# Patient Record
Sex: FEMALE | Race: OTHER | HISPANIC OR LATINO | URBAN - METROPOLITAN AREA
[De-identification: names, ages, dates, MRNs, and addresses within clinical notes are randomized per-mention and may not be internally consistent; named-entity substitution may affect disease eponyms.]

---

## 2021-03-08 ENCOUNTER — EMERGENCY (EMERGENCY)
Facility: HOSPITAL | Age: 26
LOS: 1 days | Discharge: HOME | End: 2021-03-08
Admitting: EMERGENCY MEDICINE
Payer: MEDICAID

## 2021-03-08 VITALS
SYSTOLIC BLOOD PRESSURE: 124 MMHG | RESPIRATION RATE: 18 BRPM | OXYGEN SATURATION: 100 % | HEART RATE: 93 BPM | DIASTOLIC BLOOD PRESSURE: 62 MMHG | TEMPERATURE: 99 F

## 2021-03-08 PROCEDURE — 99283 EMERGENCY DEPT VISIT LOW MDM: CPT

## 2021-03-08 RX ORDER — OXYCODONE HYDROCHLORIDE 5 MG/1
1 TABLET ORAL
Qty: 6 | Refills: 0
Start: 2021-03-08

## 2021-03-08 RX ORDER — OXYCODONE AND ACETAMINOPHEN 5; 325 MG/1; MG/1
1 TABLET ORAL ONCE
Refills: 0 | Status: DISCONTINUED | OUTPATIENT
Start: 2021-03-08 | End: 2021-03-08

## 2021-03-08 RX ADMIN — OXYCODONE AND ACETAMINOPHEN 1 TABLET(S): 5; 325 TABLET ORAL at 22:30

## 2021-03-08 NOTE — ED ADULT NURSE NOTE - OBJECTIVE STATEMENT
patient [id number]. ambulatory. came in with left tooth pain. complains of upper and lower tooth pain. needs a root canal. ice pack applied to area. given med as ordered. Will continue to monitor

## 2021-03-08 NOTE — ED ADULT TRIAGE NOTE - CHIEF COMPLAINT QUOTE
Pt. c/o dental pain, left upper and lower. States she has a tooth infection and was seen at emergency dental, prescribed 4mg dexamethasone and 800mg ibuprofen, took it around 7 pm without relief. Denies SOB, chills, cough. Endorses fever earlier this morning.

## 2021-03-08 NOTE — ED PROVIDER NOTE - PATIENT PORTAL LINK FT
You can access the FollowMyHealth Patient Portal offered by [facility] by registering at the following website: [email or web address]. By joining [employer]’s FollowMyHealth portal, you will also be able to view your health information using other applications (apps) compatible with our system.

## 2021-03-08 NOTE — ED PROVIDER NOTE - NSFOLLOWUPINSTRUCTIONS_ED_ALL_ED_FT
Follow up with your Dentist or call the clinic to schedule an appointment [phone].    Soft diet eat on opposite side of mouth.    Percocet 5/325mg  1 tablet every 6 hrs as needed for severe pain. Do not drive or drink alcohol while taking this medications.   Return to the ER for any persistent/worsening or new symptoms swelling, fevers, chills or any concerning symptoms.

## 2021-03-08 NOTE — ED ADULT TRIAGE NOTE - PAIN RATING/NUMBER SCALE (0-10): REST
Last time she spoke to Dr. Evangelina Singh she wanted her to get a colonoscopy.  N[employer] Artist would Elicia Bamberger to know how she would go about doing that and is Dr. Evangelina Singh knows of anyone around her new area in [address] [place] [address] Tae Neff Terrell Alan
Patient said that she needs to switch to another primary care physician. Advised to call the local hospital to get the names of GI doctors there.  Advised to let us know if she needs a referral.
10

## 2021-03-08 NOTE — ED PROVIDER NOTE - CLINICAL SUMMARY MEDICAL DECISION MAKING FREE TEXT BOX
25 y/o female with no significant PMHx presents to the ER c/o 4 days of right upper/lower rear tooth pain.  Pt is scheduled for a root canal in 2 days.  Pt states she is currently taking Amoxicillin, steroids and ibuprofen.  No sign of abscess, pain control , follow up PMD.

## 2021-03-08 NOTE — ED PROVIDER NOTE - OBJECTIVE STATEMENT
27 y/o female with no significant PMHx presents to the ER c/o 4 days of right upper/lower rear tooth pain.  Pt is scheduled for a root canal in 2 days.  Pt states she is currently taking Amoxicillin, steroids and ibuprofen.  Pt reports no relief at home with ibuprofen.  Pt denies fevers, chills, facial swelling, shortness of breath.